# Patient Record
Sex: MALE | Race: WHITE | NOT HISPANIC OR LATINO | Employment: UNEMPLOYED | URBAN - METROPOLITAN AREA
[De-identification: names, ages, dates, MRNs, and addresses within clinical notes are randomized per-mention and may not be internally consistent; named-entity substitution may affect disease eponyms.]

---

## 2018-09-25 PROBLEM — N28.89 RENAL MASS, RIGHT: Status: ACTIVE | Noted: 2018-09-25

## 2018-10-07 PROBLEM — A41.9 SEPTIC SHOCK: Status: ACTIVE | Noted: 2018-10-07

## 2018-10-07 PROBLEM — N17.9 ACUTE RENAL FAILURE: Status: ACTIVE | Noted: 2018-10-07

## 2018-10-07 PROBLEM — E87.5 HYPERKALEMIA: Status: ACTIVE | Noted: 2018-10-07

## 2018-10-07 PROBLEM — L08.9 NECK INFECTION: Status: ACTIVE | Noted: 2018-10-07

## 2018-10-07 PROBLEM — K66.8 FREE INTRAPERITONEAL AIR: Status: ACTIVE | Noted: 2018-10-07

## 2018-10-07 PROBLEM — K72.00 SHOCK LIVER: Status: ACTIVE | Noted: 2018-10-07

## 2018-10-07 PROBLEM — G93.40 ACUTE ENCEPHALOPATHY: Status: ACTIVE | Noted: 2018-10-07

## 2018-10-07 PROBLEM — R65.21 SEPTIC SHOCK: Status: ACTIVE | Noted: 2018-10-07

## 2018-10-07 PROBLEM — J96.00 ACUTE RESPIRATORY FAILURE: Status: ACTIVE | Noted: 2018-10-07

## 2018-10-07 PROBLEM — C64.9 RENAL CELL CANCER: Status: ACTIVE | Noted: 2018-10-07

## 2018-10-07 PROBLEM — I21.4 NSTEMI (NON-ST ELEVATED MYOCARDIAL INFARCTION): Status: ACTIVE | Noted: 2018-10-07

## 2018-10-08 PROBLEM — R63.8 INADEQUATE ORAL INTAKE: Status: ACTIVE | Noted: 2018-10-08

## 2018-10-09 PROBLEM — R57.9: Status: ACTIVE | Noted: 2018-10-07

## 2018-10-10 PROBLEM — R57.9: Status: ACTIVE | Noted: 2018-10-10

## 2018-10-11 PROBLEM — R79.89 ELEVATED TROPONIN: Status: ACTIVE | Noted: 2018-10-07

## 2018-10-12 PROBLEM — F19.10 SUBSTANCE ABUSE: Status: ACTIVE | Noted: 2018-10-12

## 2018-10-13 PROBLEM — Z75.8 DISCHARGE PLANNING ISSUES: Status: ACTIVE | Noted: 2018-10-13

## 2018-10-14 PROBLEM — R65.21 SEPTIC SHOCK: Status: ACTIVE | Noted: 2018-10-10

## 2018-10-14 PROBLEM — A41.9 SEPTIC SHOCK: Status: ACTIVE | Noted: 2018-10-10

## 2018-10-14 PROBLEM — M79.2 NEURALGIA: Status: ACTIVE | Noted: 2018-10-14

## 2018-10-14 PROBLEM — I10 HYPERTENSION: Status: ACTIVE | Noted: 2018-10-14

## 2018-10-15 PROBLEM — S82.892A CLOSED LEFT ANKLE FRACTURE: Status: ACTIVE | Noted: 2018-10-15

## 2018-10-19 PROBLEM — F11.20 NARCOTIC DEPENDENCE: Status: ACTIVE | Noted: 2018-10-19

## 2018-10-20 PROBLEM — D62 ACUTE BLOOD LOSS ANEMIA: Status: ACTIVE | Noted: 2018-10-20

## 2018-10-20 PROBLEM — J18.9 PNEUMONIA: Status: ACTIVE | Noted: 2018-10-20

## 2018-10-20 PROBLEM — J93.9 PNEUMOTHORAX: Status: ACTIVE | Noted: 2018-10-20

## 2018-10-21 PROBLEM — A41.9 SEPTIC SHOCK: Status: RESOLVED | Noted: 2018-10-10 | Resolved: 2018-10-21

## 2018-10-21 PROBLEM — G93.40 ACUTE ENCEPHALOPATHY: Status: RESOLVED | Noted: 2018-10-07 | Resolved: 2018-10-21

## 2018-10-21 PROBLEM — R65.21 SEPTIC SHOCK: Status: RESOLVED | Noted: 2018-10-10 | Resolved: 2018-10-21

## 2018-10-21 PROBLEM — K66.8 FREE INTRAPERITONEAL AIR: Status: RESOLVED | Noted: 2018-10-07 | Resolved: 2018-10-21

## 2018-10-21 PROBLEM — K72.00 SHOCK LIVER: Status: RESOLVED | Noted: 2018-10-07 | Resolved: 2018-10-21

## 2018-10-21 PROBLEM — L08.9 NECK INFECTION: Status: RESOLVED | Noted: 2018-10-07 | Resolved: 2018-10-21

## 2018-10-23 PROBLEM — R65.21 SEPTIC SHOCK: Status: ACTIVE | Noted: 2018-10-23

## 2018-10-23 PROBLEM — A41.9 SEPTIC SHOCK: Status: RESOLVED | Noted: 2018-10-23 | Resolved: 2018-10-23

## 2018-10-23 PROBLEM — R65.21 SEPTIC SHOCK: Status: RESOLVED | Noted: 2018-10-23 | Resolved: 2018-10-23

## 2018-10-23 PROBLEM — A41.9 SEPTIC SHOCK: Status: ACTIVE | Noted: 2018-10-23

## 2018-10-24 PROBLEM — J96.00 ACUTE RESPIRATORY FAILURE: Status: RESOLVED | Noted: 2018-10-07 | Resolved: 2018-10-24

## 2018-10-25 PROBLEM — S82.202A LEFT TIBIAL FRACTURE: Status: ACTIVE | Noted: 2018-10-25

## 2018-10-25 PROBLEM — F41.9 ANXIETY: Status: ACTIVE | Noted: 2018-10-25

## 2018-10-26 PROBLEM — J18.9 PNEUMONIA: Status: RESOLVED | Noted: 2018-10-20 | Resolved: 2018-10-26

## 2018-10-26 PROBLEM — J93.9 PNEUMOTHORAX: Status: RESOLVED | Noted: 2018-10-20 | Resolved: 2018-10-26

## 2018-10-26 PROBLEM — D62 ACUTE BLOOD LOSS ANEMIA: Status: RESOLVED | Noted: 2018-10-20 | Resolved: 2018-10-26

## 2018-11-19 ENCOUNTER — TELEPHONE (OUTPATIENT)
Dept: NEUROLOGY | Facility: CLINIC | Age: 38
End: 2018-11-19

## 2018-11-19 NOTE — TELEPHONE ENCOUNTER
This is a medicaid pt seen by you in the hospital, will they need to follow up with neurology? Please advise.

## 2018-11-19 NOTE — TELEPHONE ENCOUNTER
----- Message from Mary Ellen Logan LPN sent at 11/16/2018  4:36 PM CST -----  Contact: pt        ----- Message -----  From: RT Shefali  Sent: 11/16/2018   4:06 PM  To: Rocky SANCHEZ (Neuro) Staff    pt , requesting to schedule a [facility] inpatient  f/u appt, stated SNEHA ROTHMAN saw in this hospital, pt have medicaid insurance, thanks.

## 2018-11-20 DIAGNOSIS — R20.2 NUMBNESS AND TINGLING OF LEFT LEG: Primary | ICD-10-CM

## 2018-11-20 DIAGNOSIS — R20.0 NUMBNESS AND TINGLING OF LEFT LEG: Primary | ICD-10-CM

## 2018-11-27 ENCOUNTER — TELEPHONE (OUTPATIENT)
Dept: NEUROLOGY | Facility: CLINIC | Age: 38
End: 2018-11-27

## 2018-11-30 ENCOUNTER — PROCEDURE VISIT (OUTPATIENT)
Dept: NEUROLOGY | Facility: CLINIC | Age: 38
End: 2018-11-30
Payer: MEDICAID

## 2018-11-30 DIAGNOSIS — R20.0 NUMBNESS AND TINGLING OF LEFT LEG: ICD-10-CM

## 2018-11-30 DIAGNOSIS — R20.2 NUMBNESS AND TINGLING OF LEFT LEG: ICD-10-CM

## 2018-11-30 DIAGNOSIS — G54.1 LUMBOSACRAL PLEXOPATHY: Primary | ICD-10-CM

## 2018-11-30 PROCEDURE — 95886 MUSC TEST DONE W/N TEST COMP: CPT | Mod: 26,S$PBB,, | Performed by: PSYCHIATRY & NEUROLOGY

## 2018-11-30 PROCEDURE — 95909 NRV CNDJ TST 5-6 STUDIES: CPT | Mod: PBBFAC,PN | Performed by: PSYCHIATRY & NEUROLOGY

## 2018-11-30 PROCEDURE — 95909 NRV CNDJ TST 5-6 STUDIES: CPT | Mod: 26,S$PBB,, | Performed by: PSYCHIATRY & NEUROLOGY

## 2018-11-30 PROCEDURE — 95886 MUSC TEST DONE W/N TEST COMP: CPT | Mod: PBBFAC,PN | Performed by: PSYCHIATRY & NEUROLOGY

## 2018-12-03 ENCOUNTER — OFFICE VISIT (OUTPATIENT)
Dept: NEUROLOGY | Facility: CLINIC | Age: 38
End: 2018-12-03
Payer: MEDICAID

## 2018-12-03 ENCOUNTER — LAB VISIT (OUTPATIENT)
Dept: LAB | Facility: HOSPITAL | Age: 38
End: 2018-12-03
Attending: PSYCHIATRY & NEUROLOGY
Payer: MEDICAID

## 2018-12-03 VITALS
DIASTOLIC BLOOD PRESSURE: 83 MMHG | RESPIRATION RATE: 20 BRPM | HEART RATE: 137 BPM | HEIGHT: 69 IN | BODY MASS INDEX: 24.48 KG/M2 | SYSTOLIC BLOOD PRESSURE: 125 MMHG

## 2018-12-03 DIAGNOSIS — G54.1 LUMBOSACRAL PLEXOPATHY: ICD-10-CM

## 2018-12-03 DIAGNOSIS — R20.0 LEFT LEG NUMBNESS: ICD-10-CM

## 2018-12-03 DIAGNOSIS — R29.898 LEFT LEG WEAKNESS: ICD-10-CM

## 2018-12-03 DIAGNOSIS — G54.1 LUMBOSACRAL PLEXOPATHY: Primary | ICD-10-CM

## 2018-12-03 DIAGNOSIS — M79.2 NEUROPATHIC PAIN OF LOWER EXTREMITY, LEFT: ICD-10-CM

## 2018-12-03 DIAGNOSIS — F41.9 ANXIETY: ICD-10-CM

## 2018-12-03 DIAGNOSIS — M21.372 LEFT FOOT DROP: ICD-10-CM

## 2018-12-03 LAB
CRP SERPL-MCNC: 0.8 MG/L
ERYTHROCYTE [SEDIMENTATION RATE] IN BLOOD BY WESTERGREN METHOD: 5 MM/HR

## 2018-12-03 PROCEDURE — 86255 FLUORESCENT ANTIBODY SCREEN: CPT

## 2018-12-03 PROCEDURE — 99215 OFFICE O/P EST HI 40 MIN: CPT | Mod: S$PBB,,, | Performed by: PSYCHIATRY & NEUROLOGY

## 2018-12-03 PROCEDURE — 99213 OFFICE O/P EST LOW 20 MIN: CPT | Mod: PBBFAC,PN | Performed by: PSYCHIATRY & NEUROLOGY

## 2018-12-03 PROCEDURE — 85651 RBC SED RATE NONAUTOMATED: CPT | Mod: PO

## 2018-12-03 PROCEDURE — 99999 PR PBB SHADOW E&M-EST. PATIENT-LVL III: CPT | Mod: PBBFAC,,, | Performed by: PSYCHIATRY & NEUROLOGY

## 2018-12-03 PROCEDURE — 86038 ANTINUCLEAR ANTIBODIES: CPT

## 2018-12-03 PROCEDURE — 83520 IMMUNOASSAY QUANT NOS NONAB: CPT | Mod: 59

## 2018-12-03 PROCEDURE — 86140 C-REACTIVE PROTEIN: CPT

## 2018-12-03 RX ORDER — ESCITALOPRAM OXALATE 10 MG/1
10 TABLET ORAL DAILY
Qty: 30 TABLET | Refills: 1 | Status: SHIPPED | OUTPATIENT
Start: 2018-12-03 | End: 2019-02-01

## 2018-12-03 RX ORDER — GABAPENTIN 300 MG/1
CAPSULE ORAL
Qty: 180 CAPSULE | Refills: 11 | Status: SHIPPED | OUTPATIENT
Start: 2018-12-03 | End: 2019-12-10

## 2018-12-03 RX ORDER — AMITRIPTYLINE HYDROCHLORIDE 10 MG/1
10 TABLET, FILM COATED ORAL NIGHTLY
Qty: 30 TABLET | Refills: 11 | Status: SHIPPED | OUTPATIENT
Start: 2018-12-03 | End: 2019-12-03

## 2018-12-03 RX ORDER — PREDNISONE 20 MG/1
20 TABLET ORAL DAILY
Qty: 20 TABLET | Refills: 0 | Status: SHIPPED | OUTPATIENT
Start: 2018-12-03 | End: 2018-12-23
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
## 2018-12-03 NOTE — PATIENT INSTRUCTIONS
For anxiety will start Lexapro 10 mg daily.  You will take 1 pill a day for 2 months.  Your prescription has no refills after that.  You will need to call the office to get the next tired dose.    For inflammation you will start prednisone 20 mg.  Take 3 pills every morning for 3 days, then 2 pills every morning for 3 days, then 1 pill every morning for 3 days, then 1/2 pill every morning for 4 days until gone.    Continues to take amitriptyline (Elavil) 10 mg every night.    Increased the gabapentin from 300 mg 3 times a day to 600 mg 3 times a day by following the chart below:  Gabapentin 300mg  Week AM Midday PM   1 1 1 2   2 2 1 2   3 2 2 2     If the medication works at a lower dose, you do not need to keep advancing.  Common side effects are fatigue and lightheadedness.  These should pass after a few days.    You will go over to [facility] today to get a post op shoe to protect your foot when walking.    Please contact the office with any questions.

## 2018-12-04 LAB — ANA SER QL IF: NORMAL

## 2018-12-05 LAB
ANCA AB TITR SER IF: NORMAL TITER
P-ANCA TITR SER IF: NORMAL TITER

## 2018-12-07 LAB
DEPRECATED GD1B DISIALYL IGG SER QL: NORMAL
DEPRECATED GD1B DISIALYL IGM SER QL: NORMAL
GANGLIIOSIDE AB COMMENT: NORMAL
GM1 ASIALO IGG SER QL: NORMAL
GM1 ASIALO IGM SER QL: NORMAL
GM1 GANGL IGG SER QL: NORMAL
GM1 GANGL IGM SER QL: NORMAL

## 2019-02-22 ENCOUNTER — TELEPHONE (OUTPATIENT)
Dept: NEUROLOGY | Facility: CLINIC | Age: 39
End: 2019-02-22

## 2019-02-22 NOTE — TELEPHONE ENCOUNTER
----- Message from Venessa Vann sent at 2/22/2019  8:38 AM CST -----  Contact: Patient   Patient would like to reschedule his appointment. He would prefer march 6 because he will be in town that day    Callback: [phone]    Thank you

## 2019-03-04 ENCOUNTER — OFFICE VISIT (OUTPATIENT)
Dept: NEUROLOGY | Facility: CLINIC | Age: 39
End: 2019-03-04
Payer: MEDICAID

## 2019-03-04 VITALS
HEIGHT: 69 IN | WEIGHT: 147 LBS | RESPIRATION RATE: 17 BRPM | HEART RATE: 114 BPM | SYSTOLIC BLOOD PRESSURE: 144 MMHG | DIASTOLIC BLOOD PRESSURE: 90 MMHG | BODY MASS INDEX: 21.77 KG/M2

## 2019-03-04 DIAGNOSIS — G89.29 CHRONIC LEFT-SIDED LOW BACK PAIN WITH LEFT-SIDED SCIATICA: ICD-10-CM

## 2019-03-04 DIAGNOSIS — F32.A DEPRESSION, UNSPECIFIED DEPRESSION TYPE: ICD-10-CM

## 2019-03-04 DIAGNOSIS — F41.9 ANXIETY: ICD-10-CM

## 2019-03-04 DIAGNOSIS — M54.42 CHRONIC LEFT-SIDED LOW BACK PAIN WITH LEFT-SIDED SCIATICA: ICD-10-CM

## 2019-03-04 DIAGNOSIS — M62.838 MUSCLE SPASM: ICD-10-CM

## 2019-03-04 DIAGNOSIS — G54.1 LUMBOSACRAL PLEXOPATHY: Primary | ICD-10-CM

## 2019-03-04 DIAGNOSIS — M21.372 LEFT FOOT DROP: ICD-10-CM

## 2019-03-04 PROCEDURE — 99215 OFFICE O/P EST HI 40 MIN: CPT | Mod: S$PBB,,, | Performed by: PSYCHIATRY & NEUROLOGY

## 2019-03-04 PROCEDURE — 99214 OFFICE O/P EST MOD 30 MIN: CPT | Mod: PBBFAC,PN | Performed by: PSYCHIATRY & NEUROLOGY

## 2019-03-04 PROCEDURE — 99999 PR PBB SHADOW E&M-EST. PATIENT-LVL IV: CPT | Mod: PBBFAC,,, | Performed by: PSYCHIATRY & NEUROLOGY

## 2019-03-04 PROCEDURE — 99999 PR PBB SHADOW E&M-EST. PATIENT-LVL IV: ICD-10-PCS | Mod: PBBFAC,,, | Performed by: PSYCHIATRY & NEUROLOGY

## 2019-03-04 PROCEDURE — 99215 PR OFFICE/OUTPT VISIT, EST, LEVL V, 40-54 MIN: ICD-10-PCS | Mod: S$PBB,,, | Performed by: PSYCHIATRY & NEUROLOGY

## 2019-03-04 RX ORDER — BACLOFEN 10 MG/1
10 TABLET ORAL 3 TIMES DAILY
Qty: 90 TABLET | Refills: 11 | Status: SHIPPED | OUTPATIENT
Start: 2019-03-04 | End: 2020-03-03

## 2019-03-04 NOTE — PATIENT INSTRUCTIONS
Will try baclofen for your leg pain.  Start 1/2 tablet up to three times a day.  If this is not enough and you are not feeling sedated, you can go up to a whole tablet up to three times a day.    You have been referred to physical therapy for your left leg weakness and pain.    You have been referred to psychiatry/psychology to help with your depression and anxiety as a consequence of your illness and complications.
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
## 2019-03-27 ENCOUNTER — TELEPHONE (OUTPATIENT)
Dept: NEUROLOGY | Facility: CLINIC | Age: 39
End: 2019-03-27

## 2019-03-27 NOTE — TELEPHONE ENCOUNTER
----- Message from Jazmyn Traore sent at 3/27/2019  4:20 PM CDT -----  Contact: self  Patient need to speak to nurse regarding he need order for physical therapy to be sent to [facility] in [place]      Please call to advice [phone] (home)

## 2019-03-27 NOTE — TELEPHONE ENCOUNTER
----- Message from Lalito Blanchard sent at 3/27/2019  4:49 PM CDT -----  Type:  Patient Returning Call    Who Called:  Patient  Who Left Message for Patient:  Na  Does the patient know what this is regarding?:  Order for Physical Therapy  Best Call Back Number:  [phone] (home)

## 2019-03-27 NOTE — TELEPHONE ENCOUNTER
----- Message from Adan Erazo sent at 3/27/2019  4:14 PM CDT -----  Contact: pt   Pt would like a call back regarding having [facility] sent [facility] for physical therapy pt didn't have the fax #      Pt can be reached at [phone]

## 2019-03-28 ENCOUNTER — TELEPHONE (OUTPATIENT)
Dept: NEUROLOGY | Facility: CLINIC | Age: 39
End: 2019-03-28

## 2019-03-28 NOTE — TELEPHONE ENCOUNTER
Orders refaxed to [facility].  number to schedule psyche appt from referral in system.[phone].   Left message stating that

## 2019-03-28 NOTE — TELEPHONE ENCOUNTER
----- Message from Sharyn Garrison sent at 3/28/2019 11:47 AM CDT -----  Contact: self   Type:  Patient Returning Call    Who Called: patient   Who Left Message for Patient:  Kathie   Does the patient know what this is regarding?: n/a  Best Call Back Number: [phone] (home)

## 2019-04-10 ENCOUNTER — TELEPHONE (OUTPATIENT)
Dept: NEUROLOGY | Facility: CLINIC | Age: 39
End: 2019-04-10

## 2019-04-10 NOTE — TELEPHONE ENCOUNTER
"Dr. Hidalgo ok with letting PCP rx xanax for anxiety; message sent to psych to schedule pt appt asap. Called Dr. Jorge Ann's office [phone] to notify of Dr. Hidalgo's message. Dr. Ann will approve minimum amt of xanax due to pts "hx of extensive drug abuse".  "

## 2019-04-10 NOTE — TELEPHONE ENCOUNTER
----- Message from Naveen Mendoza sent at 4/10/2019  3:07 PM CDT -----  Contact: pt  Type: Needs Medical Advice    Who Called:  pt    Best Call Back Number: [phone]  Additional Information: pt states he needs this office to call Dr Jorge Ann to authorize that provider to write a Rx for Xanax. Pt states he has not yet heard from a psychiatrist. Pt is requesting a call back to discuss

## 2024-10-20 NOTE — PROCEDURES
[facility]   [facility]  [address] [place] [address]  [phone]             Full Name: Matty Guzmán Gender: Male  Patient ID: [id number] YOB: 1980      Visit Date: 11/30/2018 15:47  Age: 38 Years 5 Months Old  Examining Physician: Megan Hidalgo D.O.   Referring Physician: Megan Hidalgo DO  Height: 5 feet 11 inch  History: Patient with left leg weakness and pain after an episode of rhabdomyolysis.  Evaluate for peripheral nerve injury versus radiculopathy.      Sensory NCS      Nerve / Sites Rec. Site Onset Lat Peak Lat NP Amp Segments Distance Velocity Temp.     ms ms µV  cm m/s °C   L Sural - Ankle (Calf)      Calf Ankle NR NR NR Calf - Ankle 14 NR 36   L Superficial peroneal - Ankle      Lat leg Ankle NR NR NR Lat leg - Ankle 10 NR 36       Motor NCS      Nerve / Sites Muscle Latency Amplitude Amp % Duration Segments Temp.     ms mV % ms  °C   L Peroneal - EDB      Ankle EDB NR NR NR NR Ankle - EDB 36   L Peroneal - Tib Ant      Fib Head Tib Ant NR NR NR NR Fib Head - Tib Ant 36   L Tibial - AH      Ankle AH NR NR NR NR Ankle - AH 36       EMG Summary Table     Spontaneous Recruitment Activation Duration Amplitude Polyphasia Comment   Muscle Ins Act Fib Fasc Pattern - - - - -   L. Tibialis anterior Inc +3 0 None None    No Motor Units   L. Gastrocnemius (Medial head) Inc +4 0 None None    No Motor Units   L. Extensor hallucis longus Inc +3 0 None None    No Motor Units   L. Flexor digitorum longus Normal 0 0 None None    No Motor Units   L. Vastus medialis Inc +1 0 Sl Dec Poor +1 N/+1 N/+1 Normal   L. Tensor fasciae latae Inc +1 0 None None    No Motor Units   L. Biceps femoris (short head) Dec 0 0 None None    No Motor Units         Summary:  Nerve conduction studies were performed on the left lower extremity.  Left sural and superficial peroneal sensory responses were absent.  Left peroneal motor responses recording the  extensor digitorum brevis and tibialis anterior muscles were absent.  Left tibial motor responses were absent.  Needle EMG was performed in the left lower extremity.  Active denervation was present in the left tibialis anterior, medial gastrocnemius, extensor hallucis longus, vastus medialis, and tensor fascia suzanna muscles.  Motor units were large, long and polyphasic with reduced recruitment and poor activation in the left vastus medialis muscle.  All other motor units were absent.      Impression: This is a markedly abnormal EMG of the left lower extremity with diffuse axonal loss and denervation in virtually every region tested.   There is electrophysiologic evidence of a chronic, severe, left lumbosacral plexopathy with active denervation.  No myopathic motor units were noted.      Thank you for referring to the [facility] in [place].  Please feel free to contact the clinic if you have any further questions regarding this study or report.        ____________________________  Megan Hidalgo D.O.                              Patient